# Patient Record
Sex: FEMALE | URBAN - METROPOLITAN AREA
[De-identification: names, ages, dates, MRNs, and addresses within clinical notes are randomized per-mention and may not be internally consistent; named-entity substitution may affect disease eponyms.]

---

## 2021-07-28 ENCOUNTER — APPOINTMENT (RX ONLY)
Dept: URBAN - METROPOLITAN AREA CLINIC 34 | Facility: CLINIC | Age: 41
Setting detail: DERMATOLOGY
End: 2021-07-28

## 2021-07-28 DIAGNOSIS — L81.0 POSTINFLAMMATORY HYPERPIGMENTATION: ICD-10-CM | Status: STABLE

## 2021-07-28 PROCEDURE — ? ADDITIONAL NOTES

## 2021-07-28 PROCEDURE — ? TREATMENT REGIMEN

## 2021-07-28 PROCEDURE — 99202 OFFICE O/P NEW SF 15 MIN: CPT

## 2021-07-28 PROCEDURE — ? COUNSELING

## 2021-07-28 ASSESSMENT — LOCATION SIMPLE DESCRIPTION DERM
LOCATION SIMPLE: LEFT BUTTOCK
LOCATION SIMPLE: RIGHT BUTTOCK

## 2021-07-28 ASSESSMENT — SEVERITY ASSESSMENT: SEVERITY: MILD TO MODERATE

## 2021-07-28 ASSESSMENT — LOCATION DETAILED DESCRIPTION DERM
LOCATION DETAILED: RIGHT MEDIAL BUTTOCK
LOCATION DETAILED: LEFT BUTTOCK

## 2021-07-28 ASSESSMENT — LOCATION ZONE DERM: LOCATION ZONE: TRUNK

## 2021-07-28 NOTE — PROCEDURE: ADDITIONAL NOTES
Additional Notes: Patient states she brought a pair of new shorts from [employer] in June and began experiencing an itchy rash on her buttocks. Itching resolved on its own and patient is concerned about dark spots. \\n\\nAdvised patient hyperpigmentation should fade with time, discuss Hydroquinone to help speed up results, patient declined.  Pt asked about OTC treatments,  CR recommended Vit C and glycolic acid. Additional Notes: Patient states she brought a pair of new shorts from [id number] in June and began experiencing an itchy rash on her buttocks. Itching resolved on its own and patient is concerned about dark spots. \\n\\nAdvised patient hyperpigmentation should fade with time, discuss Hydroquinone to help speed up results, patient declined.  Pt asked about OTC treatments,  CR recommended Vit C and glycolic acid.